# Patient Record
Sex: MALE | Race: WHITE | HISPANIC OR LATINO | URBAN - METROPOLITAN AREA
[De-identification: names, ages, dates, MRNs, and addresses within clinical notes are randomized per-mention and may not be internally consistent; named-entity substitution may affect disease eponyms.]

---

## 2020-06-25 ENCOUNTER — APPOINTMENT (RX ONLY)
Dept: URBAN - METROPOLITAN AREA CLINIC 15 | Facility: CLINIC | Age: 20
Setting detail: DERMATOLOGY
End: 2020-06-25

## 2020-06-25 DIAGNOSIS — Z41.9 ENCOUNTER FOR PROCEDURE FOR PURPOSES OTHER THAN REMEDYING HEALTH STATE, UNSPECIFIED: ICD-10-CM

## 2020-06-25 PROCEDURE — ? OTHER (COSMETIC)

## 2020-06-26 ENCOUNTER — RX ONLY (OUTPATIENT)
Age: 20
Setting detail: RX ONLY
End: 2020-06-26

## 2020-06-26 ENCOUNTER — APPOINTMENT (RX ONLY)
Dept: URBAN - METROPOLITAN AREA CLINIC 15 | Facility: CLINIC | Age: 20
Setting detail: DERMATOLOGY
End: 2020-06-26

## 2020-06-26 DIAGNOSIS — Z41.9 ENCOUNTER FOR PROCEDURE FOR PURPOSES OTHER THAN REMEDYING HEALTH STATE, UNSPECIFIED: ICD-10-CM

## 2020-06-26 PROCEDURE — ? ADDITIONAL NOTES

## 2020-06-26 PROCEDURE — ? MIRADRY

## 2020-06-26 RX ORDER — CEPHALEXIN 500 MG/1
CAPSULE ORAL
Qty: 12 | Refills: 0 | Status: ERX | COMMUNITY
Start: 2020-06-26

## 2020-06-26 RX ORDER — TRIAMCINOLONE ACETONIDE 1 MG/G
CREAM TOPICAL
Qty: 1 | Refills: 0 | Status: ERX | COMMUNITY
Start: 2020-06-26

## 2020-06-26 ASSESSMENT — LOCATION DETAILED DESCRIPTION DERM
LOCATION DETAILED: LEFT ANTERIOR PROXIMAL UPPER ARM
LOCATION DETAILED: RIGHT LATERAL INFERIOR CHEST

## 2020-06-26 ASSESSMENT — LOCATION ZONE DERM
LOCATION ZONE: TRUNK
LOCATION ZONE: ARM

## 2020-06-26 ASSESSMENT — LOCATION SIMPLE DESCRIPTION DERM
LOCATION SIMPLE: LEFT UPPER ARM
LOCATION SIMPLE: CHEST

## 2020-06-26 NOTE — PROCEDURE: MIRADRY
Location 2: Right Axilla
Number Of Placements Location 2: [address] [facility]
Number Of Placements: 45
Number Of Placements Location 3: 1
Initial Location: Left Axilla
Length Of Topical Anesthesia Application (Optional): 60 minutes
Anesthesia Type: 1% lidocaine with epinephrine
Consent: Written consent obtained, risks reviewed including but not limited to crusting, scabbing, blistering, scarring, darker or lighter pigmentary change, incomplete improvement of hyperhidrosis, wrinkles.
Template Size Loation 2: 80 x 160
Detail Level: Detailed
Topical Anesthesia Type: 20% benzocaine, 8% lidocaine, 4% tetracaine
Post-Care Instructions: I reviewed with the patient in detail post-care instructions. Patient should avoid sun until area fully healed.
Anesthesia Volume In Cc: [address] [facility]
Treatment Energy Level Location 2: 5
Comments Location 2: 100 cc
Comments: 110 cc

## 2020-06-26 NOTE — PROCEDURE: ADDITIONAL NOTES
Additional Notes: Medications: \\n\\nKeflex 500 mg take 1 tab PO with food every 6 hours for 3 days\\nIbuprofen 200 mg take 2 tablets PO with food every 8 hours for 3 days\\nNeosporin apply to axillae twice a day for 1 week\\nHibiclens use to wash axillae once a day for 1 week\\nTriamcinolone 0.1% topical cream apply to red and itching areas twice a day for 7 to 10 days.
Detail Level: Simple